# Patient Record
Sex: FEMALE | URBAN - METROPOLITAN AREA
[De-identification: names, ages, dates, MRNs, and addresses within clinical notes are randomized per-mention and may not be internally consistent; named-entity substitution may affect disease eponyms.]

---

## 2022-10-06 ENCOUNTER — OFFICE VISIT (OUTPATIENT)
Dept: URBAN - METROPOLITAN AREA CLINIC 27 | Facility: CLINIC | Age: 52
End: 2022-10-06
Payer: COMMERCIAL

## 2022-10-06 VITALS
BODY MASS INDEX: 35.82 KG/M2 | HEART RATE: 95 BPM | SYSTOLIC BLOOD PRESSURE: 191 MMHG | DIASTOLIC BLOOD PRESSURE: 109 MMHG | TEMPERATURE: 97.5 F | RESPIRATION RATE: 18 BRPM | WEIGHT: 215 LBS | HEIGHT: 65 IN

## 2022-10-06 DIAGNOSIS — B20 HIV INFECTION: ICD-10-CM

## 2022-10-06 DIAGNOSIS — E66.8 OTHER OBESITY: ICD-10-CM

## 2022-10-06 DIAGNOSIS — R13.10 DYSPHAGIA: ICD-10-CM

## 2022-10-06 PROCEDURE — 99243 OFF/OP CNSLTJ NEW/EST LOW 30: CPT | Performed by: INTERNAL MEDICINE

## 2022-10-06 PROCEDURE — 99203 OFFICE O/P NEW LOW 30 MIN: CPT | Performed by: INTERNAL MEDICINE

## 2022-10-06 NOTE — HPI-TODAY'S VISIT:
Patient here at the request of Dr. Seun Gaytan for evaluation of dysphagia that has been present for the past few years. She initially underwent barium swallow in early 2018 which revealed a possible small cervical esophageal web, mild esophageal dysmotility and non-passage of a 12.5mm barium tablet beyond the GEJ, although no obstructing lesion or stricture was identified. She was supposed to have an EGD soon after that, but never followed up. She now has dysphagia on a daily basis, and has symptoms with many pills and most solids. She is really only able to tolerate eggs and liquids. She will often need to regurgitate the food bolus, at least 3 to 4 times per week. She does chew her food well and eat slowly. She has the sensation that the food bolus will lodge in her upper cervical neck region. She denies any reflux and has no abdominal pain. She has no other GI symptoms; she has not lost any weight. She has not had a prior upper endoscopy. She underwent a colonoscopy earlier this year, which was apparently normal. There is no family history of colon cancer or polyps. She is HIV+ and her viral load is undetectable with antiviral therapy. Recent labs were unremarkable including CBC and LFTs.

## 2022-10-06 NOTE — PHYSICAL EXAM GASTROINTESTINAL
Abdomen is soft, nontender, nondistended, no guarding or rigidity, no masses palpable, normal bowel sounds; moderate obesity

## 2022-10-07 ENCOUNTER — LAB OUTSIDE AN ENCOUNTER (OUTPATIENT)
Dept: URBAN - METROPOLITAN AREA CLINIC 27 | Facility: CLINIC | Age: 52
End: 2022-10-07

## 2022-11-08 ENCOUNTER — WEB ENCOUNTER (OUTPATIENT)
Dept: URBAN - METROPOLITAN AREA SURGERY CENTER 7 | Facility: SURGERY CENTER | Age: 52
End: 2022-11-08

## 2022-11-11 ENCOUNTER — CLAIMS CREATED FROM THE CLAIM WINDOW (OUTPATIENT)
Dept: URBAN - METROPOLITAN AREA CLINIC 4 | Facility: CLINIC | Age: 52
End: 2022-11-11
Payer: COMMERCIAL

## 2022-11-11 ENCOUNTER — CLAIMS CREATED FROM THE CLAIM WINDOW (OUTPATIENT)
Dept: URBAN - METROPOLITAN AREA SURGERY CENTER 7 | Facility: SURGERY CENTER | Age: 52
End: 2022-11-11
Payer: COMMERCIAL

## 2022-11-11 ENCOUNTER — TELEPHONE ENCOUNTER (OUTPATIENT)
Dept: URBAN - METROPOLITAN AREA CLINIC 27 | Facility: CLINIC | Age: 52
End: 2022-11-11

## 2022-11-11 DIAGNOSIS — K22.2 ACQUIRED ESOPHAGEAL RING: ICD-10-CM

## 2022-11-11 DIAGNOSIS — K31.89 OTHER DISEASES OF STOMACH AND DUODENUM: ICD-10-CM

## 2022-11-11 DIAGNOSIS — K29.70 GASTRITIS, UNSPECIFIED, WITHOUT BLEEDING: ICD-10-CM

## 2022-11-11 DIAGNOSIS — R13.14 CRICOPHARYNGEAL DISORDER: ICD-10-CM

## 2022-11-11 PROCEDURE — 43450 DILATE ESOPHAGUS 1/MULT PASS: CPT | Performed by: INTERNAL MEDICINE

## 2022-11-11 PROCEDURE — 43235 EGD DIAGNOSTIC BRUSH WASH: CPT | Performed by: INTERNAL MEDICINE

## 2022-11-11 PROCEDURE — G8907 PT DOC NO EVENTS ON DISCHARG: HCPCS | Performed by: INTERNAL MEDICINE

## 2022-11-11 PROCEDURE — 88305 TISSUE EXAM BY PATHOLOGIST: CPT | Performed by: PATHOLOGY

## 2022-11-11 RX ORDER — PANTOPRAZOLE SODIUM 40 MG/1
1 TABLET TABLET, DELAYED RELEASE ORAL ONCE A DAY
Qty: 90 | Refills: 3 | OUTPATIENT
Start: 2022-11-11

## 2022-12-20 ENCOUNTER — DASHBOARD ENCOUNTERS (OUTPATIENT)
Age: 52
End: 2022-12-20

## 2022-12-20 ENCOUNTER — OFFICE VISIT (OUTPATIENT)
Dept: URBAN - METROPOLITAN AREA CLINIC 27 | Facility: CLINIC | Age: 52
End: 2022-12-20
Payer: COMMERCIAL

## 2022-12-20 VITALS
SYSTOLIC BLOOD PRESSURE: 164 MMHG | WEIGHT: 248 LBS | HEIGHT: 65 IN | HEART RATE: 78 BPM | BODY MASS INDEX: 41.32 KG/M2 | DIASTOLIC BLOOD PRESSURE: 111 MMHG

## 2022-12-20 DIAGNOSIS — R13.10 DYSPHAGIA: ICD-10-CM

## 2022-12-20 DIAGNOSIS — B20 HIV INFECTION: ICD-10-CM

## 2022-12-20 DIAGNOSIS — E66.01 MORBID OBESITY: ICD-10-CM

## 2022-12-20 PROBLEM — 414916001 OBESITY: Status: ACTIVE | Noted: 2022-10-06

## 2022-12-20 PROBLEM — 40739000 DYSPHAGIA: Status: ACTIVE | Noted: 2022-10-06

## 2022-12-20 PROBLEM — 238136002 MORBID OBESITY: Status: ACTIVE | Noted: 2022-12-20

## 2022-12-20 PROBLEM — 86406008 HIV INFECTION: Status: ACTIVE | Noted: 2022-10-06

## 2022-12-20 PROCEDURE — 99212 OFFICE O/P EST SF 10 MIN: CPT | Performed by: INTERNAL MEDICINE

## 2022-12-20 RX ORDER — PANTOPRAZOLE SODIUM 40 MG/1
1 TABLET TABLET, DELAYED RELEASE ORAL ONCE A DAY
Qty: 90 | Refills: 3 | Status: ACTIVE | COMMUNITY
Start: 2022-11-11

## 2022-12-20 NOTE — HPI-TODAY'S VISIT:
Patient here for followup of dysphagia that has been present for the past few years. She initially underwent barium swallow in early 2018 which revealed a possible small cervical esophageal web, mild esophageal dysmotility and non-passage of a 12.5mm barium tablet beyond the GEJ, although no obstructing lesion or stricture was identified. She was supposed to have an EGD soon after that, but never followed up. She underwent a repeat barium swallow in October which revealed narrowing at the GEJ likely due to stricture; no visible mass was seen.  There was restriction of passage of the barium tablet at this level.  She subsequently underwent upper endoscopy 5 weeks ago which revealed a well-defined Schatzki's ring immediately above the gastroesophageal junction.  Mild difficulty in passage of the scope across the area was encountered.  Mild nonerosive gastritis was seen.  The remainder of the exam was unremarkable.  [place] dilation with a 44 [place] dilator was easily performed.  A 48 [place] dilator could only be passed nursing home into the esophagus.  She was started on pantoprazole 40 mg once daily following that exam, and is now here in follow-up.  Her dysphagia has resolved.  She is careful to chew her food well and eat slowly.  She does not have any reflux symptoms.  She is still taking the pantoprazole once daily.  She is trying to lose weight.  She underwent a colonoscopy earlier this year, which was apparently normal. There is no family history of colon cancer or polyps. She is HIV+ and her viral load is undetectable with antiviral therapy. Recent labs were unremarkable including CBC and LFTs.